# Patient Record
(demographics unavailable — no encounter records)

---

## 2018-04-10 NOTE — RAD
Indication:Epigastric abdominal pain and nausea. History of breast, 

uterine and skin cancer.

 

TECHNIQUE: CT abdomen chest, and pelvis without IV contrast with 

multiplanar reformats.

 

COMPARISON: None

 

FINDINGS: Limited exam due to lack of IV contrast.

 

CT CHEST: 3.7 x 3.7 cm low attenuating nodule is seen in the right thyroid

lobe. Heart is normal in size. No pericardial or pleural effusion. No 

axillary, mediastinal adenopathy. Evaluation of hilar lymph nodes limited 

due to lack of IV contrast. Lungs are clear.

 

CT abdomen and pelvis:

Noncontrast appearance of the liver, spleen, gallbladder, pancreas, 

adrenals and kidneys is within normal limits. No retroperitoneal or pelvic

adenopathy. No bowel obstruction. Normal appendix. Urinary bladder within 

normal limits. Status post hysterectomy. No solid adnexal lesions. No free

pelvic fluid. No suspicious bony lesion.

 

IMPRESSION: Limited exam due to lack of IV contrast.

1. Low attenuating right thyroid lobe lesion may represent a large colloid

cyst. Nonemergent ultrasound of the thyroid recommended.

2. No acute findings.

 

 

Electronically signed by: Mario Alberto Trent DO (4/10/2018 10:02 PM) [facility]

## 2018-04-10 NOTE — RAD
Indication: Shortness of breath, cough.

 

TECHNIQUE: PA and lateral views of the chest

 

COMPARISON: None

 

FINDINGS:

Heart is normal in size. Lungs are clear. No pneumothorax or pleural 

effusion. Visualized bony thorax is within normal limits.

 

IMPRESSION:

No acute cardiopulmonary process.

 

Electronically signed by: Mario Alberto Trent DO (4/10/2018 6:18 PM) [facility]
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
## 2018-04-11 NOTE — HP
ADMIT DATE:  2018



HISTORY OF PRESENT ILLNESS:  The patient is a 46-year-old  female

patient, who came to the Emergency Room with a complaint of chest pain that

described mostly on the left side radiating to the left shoulder and left arm. 

She rated the pain as 6/10 in severity, but denied any nausea or vomiting. 

Denied any diaphoresis.  Did complain of shortness of breath and feel dizzy. 

She was seen in the Emergency Room and was extensively investigated.  Her EKG

showed that she was in sinus rhythm with heart rate of _____ first-degree AV

block.  She has left atrial enlargement and left axis deviation and also an old

Q-waves in the anterior leads, which is likely from the old myocardial

infarction.  However, there are no ST segment changes consistent with acute

cardiac ischemia.  Her first set of cardiac enzyme was normal.  The patient was

admitted to do 2 more sets of cardiac enzyme and to consult the cardiology team.



PAST MEDICAL HISTORY:  Significant for rheumatoid arthritis, systemic lupus

erythematosus, hypertension.  She has cancer of the uterus, cervix, breast, skin

and brain.  She apparently metastatic brain tumor, treated with radiation

therapy.  She also has a history of pericarditis and according to her myocardial

infarction in .



PAST SURGICAL HISTORY:  Significant for hysterectomy, tonsillectomy,

adenoidectomy, 4 , lumpectomy, 2 dental surgeries and laryngotomy x 5.



ALLERGIES:  She is allergic to IODINATED CONTRAST, ORAL AND IV DYE, PENICILLIN,

SULFA, ACETAMINOPHEN, BANANA, CIPROFLOXACIN, CLARITHROMYCIN, STRAWBERRY.



FAMILY HISTORY:  She has 2 brothers, who are younger.  Her youngest brother has

skin cancer x 3 and he is also known to have _____.  Her father is still alive

and is known to have diabetes, hypertension, rheumatoid arthritis, and

hyperlipidemia.  Her mother is still alive, has diabetes, hypertension,

osteoarthritis and hyperlipidemia.



SOCIAL HISTORY:  She is , has 2 sons and 2 daughters.  She never smoked,

does not drink alcohol or use any drugs.  She is working for the [employer]

[employer] [facility] with [employer].



REVIEW OF SYSTEMS:  The patient denied any blurring of vision, cataract,

glaucoma or macular degeneration.  Denied any earache, tinnitus or sensorineural

deafness.  Denied any nosebleeds, stuffy nose or postnasal drip.  Denied any

sore throat, sore tongue, toothache, hoarseness of voice or difficulty

swallowing.  Denied any nausea, vomiting, diarrhea or constipation.  Denied any

hematemesis, melena or hematochezia.  Denied any dysuria, frequency or

hematuria.  Did may have chest pain.  No cough or phlegm.  Did complain of

shortness of breath.  Denied any orthopnea or paroxysmal dyspnea.  Denied any

cough, phlegm or hemoptysis.



OBJECTIVE:

GENERAL:  On examining her, she looked well and was clearly in no apparent

respiratory distress, no pallor, jaundice, cyanosis, or thyromegaly.  No jugular

venous distension.  No limb edema.

VITAL SIGNS:  Her heart rate was 86 and blood pressure 141/61, temperature was

98, respiratory rate was 16 and oxygen saturation was 100% on room air.

HEAD, EYES, EARS, NOSE AND THROAT:  Showed normocephalic, atraumatic.

NECK:  Supple.

HEART:  Showed normal first and second heart sounds with no gallop, rub or

murmur.

CHEST:  Clear to auscultation.  No crepitation or rhonchi.

ABDOMEN:  Distended, soft, nontender.

NEUROLOGIC:  She is awake, alert, responding appropriately.  All cranial nerves

intact.

EXTREMITIES:  She moves extremities without difficulty.  She ambulates without

assistance or assistive devices.



While in the Emergency Room, she had lab work done showed a white cell count of

6100, hemoglobin 14, hematocrit 41, MCV 91, and platelet count 107,000.  Her

chemistry showed a serum sodium 140, potassium 3.2, chloride 101, bicarbonate

29, anion gap of 10, BUN 9, creatinine 0.9, estimated GFR was 77, glucose 87,

calcium was 10.3, magnesium was 2.  Total bilirubin 2.  However, AST, ALT,

alkaline phosphatase were normal.  Her troponin was less than 0.017 and total

protein was 7.6, albumin was 4.3.  Her D-dimer was 0.26.  Urinalysis was

essentially unremarkable.



Her EKG showed that she has in sinus rhythm with prolonged TX interval.  Chest

x-ray showed no evidence of any acute cardiopulmonary process.  CT scan of the

chest, abdomen and pelvis showed that there is low attenuating right thyroid

lobe lesions were present a large colloid cyst, nonemergent ultrasound and

thyroid recommended no acute finding.  Plan is to admit the patient to do 2 more

sets of cardiac enzyme, consult the Cardiology and decide on further management

accordingly.





______________________________

DUTCH MATA MD



DR:  Dakota  JOB#:  [id number] / [id number]

DD:  2018 14:41  DT:  2018 15:14

## 2018-04-11 NOTE — EKG
[facility] [address], [place] [address]

Test Date:    2018-04-10               Test Time:    16:51:06

Pat Name:     ALEXANDRA MONGE            Department:   

Patient ID:   [id number]           Room:         120 A

Gender:       F                        Technician:   

:          1972               Requested By: JIM ENGEL

Order Number: [id number]            Reading MD:   Rgoer Perera MD

                                 Measurements

Intervals                              Axis          

Rate:         74                       P:            57

LA:           208                      QRS:          0

QRSD:         100                      T:            21

QT:           392                                    

QTc:          440                                    

                           Interpretive Statements

SINUS RHYTHM



Electronically Signed On 2018 16:41:05 CDT by Roger Perera MD

## 2018-04-12 NOTE — PDOC
PROVIDER NOTE


Late entry for 4/11/2018





Cardiology consultation note





Reason for consultation chest pain





Pleasant 46-year-old woman who comes into the hospital in the setting of chest 

pain. She had this chest pain for approximately 2 days prior to admission. 

Denies any previous cardiac issues. No associated nausea, vomiting, diaphoresis

, fevers or chills. No palpitations or syncopal events. Denies any exertional 

dyspnea, orthopnea or PND.





Past medical history is notable for uterine cancer





Social history is unremarkable. Patient denies any alcohol, tobacco or illicit 

drug use.





Family history is noncontributory





Allergies to multiple medications as noted.





Review systems is negative for 10 out of 14 systems reviewed unless otherwise 

mentioned above in history of present illness.





Physical examination


Vital signs stable


Head and neck exam unremarkable


Cardiac regular rate and rhythm without any murmurs rubs gallops lungs are 

clear to auscultation bilaterally.


Abdomen is obese nontender nondistended


Extremities no clubbing cyanosis or edema


Neurologic no focal deficits





Diagnostic studies


EKG is unremarkable


Labs are unremarkable.


Echocardiogram demonstrates normal LV systolic function without significant 

wall motor mellitus.





Impression:


1. Atypical chest pain without any alarm symptoms. Normal enzymes and EKG 

without any significant modalities. In light of the fact that her 

echocardiogram demonstrates normal regional wall motion can safely be 

discharged with reevaluation outpatient basis if she has any recurrent chest 

pain.





Thank you for this consultation.











ANIA YE MD Apr 12, 2018 16:11
190

## 2018-04-12 NOTE — CARD
MR#: [id number]

Account#: [id number]

Accession#: [id number]

Date of Study: 04/12/2018

Ordering Physician: ANIA YE, 

Referring Physician: DUTCH MATA, 

Tech: CARMELA Gotti





--------------- APPROVED REPORT --------------





EXAM: Two-dimensional and M-mode echocardiogram with Doppler and color Doppler.



Other Information 

Quality : AverageHR: 60bpm



INDICATION

Chest Pain 



2D DIMENSIONS 

RVDd3.2 (2.9-3.5cm)Left Atrium(2D)3.5 (1.6-4.0cm)

IVSd1.1 (0.7-1.1cm)Aortic Root(2D)2.7 (2.0-3.7cm)

LVDd4.7 (3.9-5.9cm)LVOT Diameter2.0 (1.8-2.4cm)

PWd1.4 (0.7-1.1cm)LVDs2.5 (2.5-4.0cm)

FS (%) 46.5 %SV81.2 ml

LVEF(%)77.9 (>50%)



Aortic Valve

AoV Peak Ismael.151.7cm/sAoV VTI36.9cm

AO Peak GR.9.2mmHgLVOT Peak Ismael.134.5cm/s

LVOT  VTI 31.86cmAO Mean GR.5mmHg

HUMZA (VMAX)2.86jz3FAP   (VTI)2.71cm2



Mitral Valve

MV E Usvjrnhg125.6cm/sMV A Velocity0.1cm/s

E/A  Vocnv2982.0



Pulmonary Vein

S1 Ltgrdrqq21.8cm/sD2 Oqrfbfmc47.9cm/s



 LEFT VENTRICLE 

The left ventricle is normal size. There is mild hypertrophy of the LVPW. The left ventricular systol
ic function is normal and the ejection fraction is within normal range. EF 65% There is normal LV seg
mental wall motion. The left ventricular diastolic function and filling is normal for age.



 RIGHT VENTRICLE 

The right ventricle is normal size. The right ventricular systolic function is normal.



 ATRIA 

The left atrium size is normal. The right atrium size is normal. The interatrial septum is intact wit
h no evidence for an atrial septal defect or patent foramen ovale as noted on 2-D or Doppler imaging.




 AORTIC VALVE 

The aortic valve is thickened but opens well. Doppler and Color Flow revealed no significant aortic r
egurgitation. There is no significant aortic valvular stenosis. There is no aortic valvular vegetatio
n.



 MITRAL VALVE 

The mitral valve is thickened but opens well. There is no evidence of mitral valve prolapse. There is
 no mitral valve stenosis. Doppler and Color-flow revealed trace to mild mitral regurgitation.



 TRICUSPID VALVE 

The tricuspid valve is not well visualized. Doppler and Color Flow revealed no tricuspid valve regurg
itation noted. There is no tricuspid valve prolapse or vegetation. There is no tricuspid valve stenos
is.



 PULMONIC VALVE 

The pulmonic valve is not well visualized. Doppler and Color Flow revealed no pulmonic valvular regur
gitation. There is no pulmonic valvular stenosis.



 GREAT VESSELS 

The aortic root is normal in size. The IVC collapses <50% with inspiration. 



 PERICARDIAL EFFUSION 

There is no pleural effusion. There is no evidence of significant pericardial effusion.



Critical Notification

Critical Value: No



<Conclusion>

The left ventricular systolic function is normal and the ejection fraction is within normal range. EF
 65%

There is normal LV segmental wall motion.



Signed by : Ania Ye, 

Electronically Approved : 04/12/2018 13:05:10

## 2018-04-14 NOTE — DS
DATE OF DISCHARGE:  04/12/2018



HOSPITAL COURSE:  The patient is a 46-year-old  female patient.  The

patient was admitted with a complaint of chest pain that she describes as a dull

aching, mostly left-sided, radiating to the left shoulder and left arm, rated

about 6/10 in severity; however, she denied any nausea, vomiting.  Denied any

diaphoresis.  Did complain of shortness of breath, feeling dizzy.  She was

evaluated in the Emergency Room.  Her EKG showed no evidence of any ischemic

changes, but she has first-degree heart block, left atrial enlargement and left

axis deviation.  She has had 3 sets of cardiac enzymes that were negative.  She

had an echocardiogram done, which showed that she has normal left ventricular

systolic function, normal ejection fraction.  Ejection fraction more than 65%,

normal left ventricular segmental wall motion.  We did consult the cardiology

team and they recommended that the patient can be discharged safely home given

that all her labs, EKGs and echocardiogram were within normal limits, and her

risk factor for premature coronary artery disease does not warrant any further

ischemic workup.



PHYSICAL EXAMINATION:

GENERAL:  When I examined her today, she looked well and was clearly in no

apparent respiratory distress.  She was pale.  No jaundice, cyanosis, or

thyromegaly.  No jugular venous distension.  No lower limb edema.

VITAL SIGNS:  Her heart rate was 60, blood pressure 147/74, temperature was

98.1, respiratory rate was 18 and oxygen saturation was 97% on room air.

HEAD, EYES, EARS, NOSE AND THROAT:  Showed she is normocephalic, atraumatic.

NECK:  Supple.

HEART:  Showed normal first and second heart sounds with no gallop, rub or

murmur.

CHEST:  Clear to auscultation.  No crepitation or rhonchi.

ABDOMEN:  Distended, soft, nontender.  No guarding or rigidity.  No

organomegaly.  All hernial orifices intact.  Bowel sounds normal.

NEUROLOGIC:  She was awake, alert, responding appropriately.  All cranial nerves

are intact.  She moves extremities without difficulty.  She ambulates without

assistance or assistive devices.



LABORATORY WORK:  On discharge date showed a serum sodium 141, potassium 3.8,

chloride 105, bicarbonate 29, anion gap of 7, BUN 7, creatinine 0.7, estimated

GFR was 90 mL per minute.  Her glucose was 93, calcium was 9.4.  Her TSH was

1.858 and white cell count was 5200, hemoglobin 13, hematocrit 38, MCV 92, and

platelet count of 193,000.



DISCHARGE MEDICATIONS:  The patient was discharged home to continue on following

medication:  Alprazolam 0.25 mg twice a day, buspirone 15 mg twice a day,

diphenhydramine 25 mg at bedtime, escitalopram oxalate 20 mg once a day,

losartan/hydrochlorothiazide one tablet daily, naproxen 220 mg twice a day,

Protonix 40 mg daily and trazodone 50 mg at bedtime.



FINAL DISCHARGE DIAGNOSIS:  Chest pain, myocardial infarction ruled out,

hypertension, depression.





______________________________

DUTCH MATA MD



DR:  BALA/aguila  JOB#:  [id number] / [id number]

DD:  04/13/2018 15:26  DT:  04/14/2018 03:54

## 2019-01-29 NOTE — [facility]
EXAM: MRI left shoulder

 

DATE: 1/29/2019 8:00 AM

 

COMPARISON: None

 

INDICATION: Left shoulder pain. Decreased range of motion.

 

TECHNIQUE: Multiplanar, multisequence MRI of the left shoulder was 

performed without contrast.

 

FINDINGS:

Mild AC joint degenerative changes are seen. Associated edema is likely 

degenerative. No evidence of fracture. Mild downsloping distal acromion. 

No os acromiale. Type I acromion.

 

Subacromial subdeltoid bursal distention likely bursitis. No glenohumeral 

joint effusion.

 

Mild thickening and increased signal within the supraspinatus tendon 

distally likely tendinosis. Shallow bursal sided tear of the distal 

supraspinatus tendon is seen involving approximately 10% tendon thickness.

 

Thickening of the intra-articular long head biceps tendon likely severe 

tendinosis. Extra articular long head biceps tendon is seen within the 

bicipital groove.

 

Evaluation of the labrum is limited on this nonarthrographic study. Mild 

diminutive appearance of the posterior labrum, possibly physiologic for 

this patient although may also be seen with tear.

 

No definite full-thickness cartilage tear is identified. No evidence for 

fracture or AVN.

 

IMPRESSION:   

1.  Shallow bursal sided tear within the supraspinatus tendon distally.

2.  Severe tendinosis intra-articular long head biceps tendon.

3.  Subacromial subdeltoid bursal distention likely bursitis.

4.  Mild diminutive appearance of the posterior labrum, possibly 

physiologic for this patient although may also be seen with tear

5.  AC joint degenerative changes with edema.

 

Electronically signed by: Fab Ruano MD (1/29/2019 10:09 AM) [facility]

## 2019-04-05 NOTE — PDOC4
Operative Note


Operative Note


Date of surgery: 4/5/2019





Preoperative diagnosis: Rotator cuff tear possible superior labral tear, 

acromioclavicular joint pain post contusion





Postoperative diagnosis: Rotator cuff had partial thickness bursal sided 

fraying superior labrum was intact she had some adhesive capsulitis and 

acromioclavicular joint degenerative change





Surgeon: Bruce





Assist: Juanpablo





Anesthesia: Gen. plus scalene block





Estimated blood loss 10 mL





Complications: None





Operative indications: Tawnya is a 47-year-old female that injured her right 

shoulder at work and had pain and weakness MRI was suspicious for a rotator 

cuff tear as well as superior labral abnormality. She was also clinically very 

tender over the acromioclavicular joint I had gone over with her the 

possibility of rotator cuff repair appropriate treatment of the superior labral 

injury and likely decompression distal clavicle's excision. I went through 

risks benefits postoperative course of the procedure with her the typical 

recovery and restrictions and rationale all her questions were answered she 

wishes to proceed with surgical evaluation and treatment.





Operative text: Patient was identified procedure verified patient placed in the 

supine position on the operating table. After adequate amounts of general 

anesthesia were administered the left upper extremity was prepped and draped in 

standard sterile fashion. Timeout was performed patient procedure identified 

and verified. The shoulder was first examined under anesthesia and found to 

have some limitation of terminal elevation as well as abduction and external 

rotation. This was restored with manipulation to full range of motion and no 

instability was noted. The arm was then placed in the arthroscopic arm lopez 

with a total of 15 pounds of traction a posterior portal was established 

anterior portal established using spinal needle localization and the shoulder 

joint was systematically examined. She did have some bleeding from the capsular 

release with manipulation the superior labrum appeared intact biceps tendon was 

just irritated but found to be free from any subluxation or significant 

fraying. Rotator cuff insertion was intact to the joint surface labrum was 

intact with only mild fraying not requiring any debridement glenohumeral joint 

surfaces otherwise noted be intact and she had a normal bare area of the 

humerus. Subacromial space was then entered bursectomy was performed to allow 

visualization and she was noted to have significant bursal sided fraying which 

was debrided back to stable tissue but did not require repair. Coracoacromial 

ligament was taken down and anterior acromial spur was converted to a type I 

acromion using cutting block technique. The distal clavicle was noted to be 

narrowed and arthritic and was excised 1 cm to allow a joint space preserving 

the overlying joint capsule for stability. Bony fragments were evacuated with 

arthroscopic shaver and the rotator cuff insertion examined in all degrees of 

internal/external rotation and no repair necessary. Joint was drained of 

arthroscopic fluid portals closed with nylon suture sterile dressings were 

applied she was returned to recovery room in stable condition having tolerated 

procedure well. Juanpablo dougherty was present for the procedure and assisted 

in the prepping draping skin closure











ARACELI WISEMAN MD Apr 5, 2019 09:58

## 2019-04-05 NOTE — DISCH
DISCHARGE INSTRUCTIONS


Condition on Discharge


Condition on Discharge:  Stable





Activity After Discharge


Activity Instructions for Disc:  Other ROM activity ( sling for comfort may 

remove for active passive range of motion strengthening physical therapy and 

other activities), Other, see below (passive and active stretching advancing to 

strengthening as tolerated)


Exercise Instruction after Dis:  Exercise per therapy


Weight Bearing Status after Di:  Non weight bearing (fine motor use of hand at 

side only with home and work activities)





Diet after Discharge


Diet after Discharge:  Regular





Wound Incision Care


Wound/Incision Care:  Change dressing (remove dressing in 2 days may then shower

, no soaking until sutures removed)





Community/Resources/Services


Services at Discharge:  PT EVALUATE & TREAT (aggressive passive and active 

range of motion advancing the strengthening as tolerated no other restrictions)





Contacting the  after DC


Call your doctor for:  Concerns you may have





Follow-Up


Follow up with:  Dr. Barrera 10 days











ARACELI BARRERA MD Apr 5, 2019 06:58

## 2019-06-04 NOTE — DISCH
DISCHARGE INSTRUCTIONS


Condition on Discharge


Condition on Discharge:  Stable





Activity After Discharge


Activity Instructions for Disc:  No restrictions (no sling or limitations wh

atsoever), Other, see below (mediate aggressive range of motion with physical 

therapy)





Diet after Discharge


Diet after Discharge:  Regular





Wound Incision Care


Wound/Incision Care:  Ice to area for comfort





Community/Resources/Services


Services at Discharge:  PT EVALUATE & TREAT (start physical therapy as soon as 

possible no later than tomorrow)











ARACELI WISEMAN MD            Jun 4, 2019 11:01

## 2019-06-04 NOTE — PDOC4
Operative Note


Operative Note


Date of surgery: 6/4/2019





Preoperative diagnosis: Adhesive capsulitis left shoulder





Postoperative diagnosis: Same





Operative procedure: Manipulation left shoulder under anesthesia and injection 

glenohumeral joint





Surgeon: Bruce





Anesthesia with deep sedation





Complications: None





Operative indications: Patient underwent previous shoulder surgery and has been 

regressing in her range of motion despite attempts of physical therapy and home 

stretching program strength is getting better but motion is decreasing as is her

pain. We had discussed adhesive capsulitis and the natural course of that entity

as well as the possibility of it running its course, the ineffectiveness of 

current stretching and physical therapy regimen and the possibility of 

manipulation under anesthesia. She wishes to proceed with manipulation and inje

ction and understands that she needs to get back into physical therapy right 

away to keep the motion that has already been regained.





Operative text: Patient was identified procedure verified patient placed in the 

supine position on the operating table. After adequate amounts of deep sedation 

provided by anesthesia the shoulder was examined under anesthesia and found to 

lack terminal elevation and external rotation and abduction of about 65 each, 

internal rotation was lacking about 20. After manipulation palpable and audible

release of tissue was noted and full range of motion was regained without 

instability and under sterile conditions 3 mL of half percent plain Marcaine and

1 mL 80 mg/mm Depo-Medrol were injected into the glenohumeral joint from an ante

rior approach. She was recovered in stable condition and instructed in immediate

physical therapy with aggressive range of motion and no restrictions whatsoever











ARACELI WISEMAN MD            Jun 4, 2019 11:06

## 2020-08-10 NOTE — PHYS DOC
Past History


Past Medical History:  Anxiety, Asthma, Depression, Hypertension, Other


Additional Past Medical Histor:  C3C4 COMPRESSION FX, URINARY REFLUX


Past Surgical History:  , Hysterectomy, Tonsillectomy, Other


Additional Past Surgical Histo:  L ROTATOR CUFF, R-BREAST LUMPECTOMY


Smoking:  Non-smoker


Alcohol Use:  None


Drug Use:  None





General Adult


EDM:


Chief Complaint:  HEADACHE





HPI:


HPI:





Patient is a [age] year old [sex] who presents with []





Review of Systems:


Review of Systems:


Constitutional:  Denies fever or chills 


Eyes:  Denies change in visual acuity 


HENT:  Denies nasal congestion or sore throat 


Respiratory:  Denies cough or shortness of breath 


Cardiovascular:  Denies chest pain or edema 


GI:  Denies abdominal pain, nausea, vomiting, bloody stools or diarrhea 


: Denies dysuria 


Musculoskeletal:  Denies back pain or joint pain 


Integument:  Denies rash 


Neurologic:  Denies headache, focal weakness or sensory changes 


Endocrine:  Denies polyuria or polydipsia 


Lymphatic:  Denies swollen glands 


Psychiatric:  Denies depression or anxiety





Heart Score:


Risk Factors:


Risk Factors:  DM, Current or recent (<one month) smoker, HTN, HLP, family 

history of CAD, obesity.


Risk Scores:


Score 0 - 3:  2.5% MACE over next 6 weeks - Discharge Home


Score 4 - 6:  20.3% MACE over next 6 weeks - Admit for Clinical Observation


Score 7 - 10:  72.7% MACE over next 6 weeks - Early Invasive Strategies





Current Medications:


Current Meds:





Current Medications








 Medications


  (Trade)  Dose


 Ordered  Sig/Rand  Start Time


 Stop Time Status Last Admin


Dose Admin


 


 Dexamethasone


  (Decadron)  10 mg  1X  ONCE  8/10/20 11:15


 8/10/20 11:16   














Allergies:


Allergies:





Allergies








Coded Allergies Type Severity Reaction Last Updated Verified


 


  banana Allergy Severe Anaphylaxis 18 Yes


 


  fish derived Allergy Severe  18 Yes


 


  bill Allergy Severe  18 Yes


 


  nut - unspecified Allergy Severe Anaphylaxis 18 Yes


 


  strawberry Allergy Severe Anaphylaxis 18 Yes


 


  Iodinated Contrast- Oral and IV Dye Allergy Intermediate  4/10/18 Yes


 


  Penicillins Allergy Intermediate  4/10/18 Yes


 


  Sulfa (Sulfonamide Antibiotics) Allergy Intermediate  4/10/18 Yes


 


  ciprofloxacin Allergy Intermediate  4/10/18 Yes


 


  clarithromycin Allergy Intermediate  4/10/18 Yes


 


  hydrocodone Allergy Intermediate  4/10/18 Yes


 


  ibuprofen Allergy Intermediate  4/10/18 Yes


 


  latex Allergy Intermediate  4/10/18 Yes


 


  levofloxacin Allergy Intermediate  4/10/18 Yes


 


  shellfish derived Allergy Intermediate  4/10/18 Yes











Physical Exam:


PE:





Constitutional: Well developed, well nourished, no acute distress, non-toxic 

appearance. []


HENT: Normocephalic, atraumatic, bilateral external ears normal, oropharynx 

moist, no oral exudates, nose normal. []


Eyes: PERRLA, EOMI, conjunctiva normal, no discharge. [] 


Neck: Normal range of motion, no tenderness, supple, no stridor. [] 


Cardiovascular:Heart rate regular rhythm, no murmur []


Lungs & Thorax:  Bilateral breath sounds clear to auscultation []


Abdomen: Bowel sounds normal, soft, no tenderness, no masses, no pulsatile 

masses. [] 


Skin: Warm, dry, no erythema, no rash. [] 


Back: No tenderness, no CVA tenderness. [] 


Extremities: No tenderness, no cyanosis, no clubbing, ROM intact, no edema. [] 


Neurologic: Alert and oriented X 3, normal motor function, normal sensory 

function, no focal deficits noted. []


Psychologic: Affect normal, judgement normal, mood normal. []





Current Patient Data:


Vital Signs:





                                   Vital Signs








  Date Time  Temp Pulse Resp B/P (MAP) Pulse Ox O2 Delivery O2 Flow Rate FiO2


 


8/10/20 10:05 98.8 70 20 148/83 (104) 98 Room Air  











EKG:


EKG:


[]





Radiology/Procedures:


Radiology/Procedures:


[]





Course & Med Decision Making:


Course & Med Decision Making


Pertinent Labs and Imaging studies reviewed. (See chart for details)





[]





Dragon Disclaimer:


Dragon Disclaimer:


This electronic medical record was generated, in whole or in part, using a voice

 recognition dictation system.





Departure


Departure:


Impression:  


   Primary Impression:  


   Headache


   Qualified Codes:  R51 - Headache


Disposition:  01 HOME/RESIDENCE PRIOR TO ADM


Condition:  STABLE


Referrals:  


KASEY YOUNG MD (PCP)


Patient Instructions:  Headache, FAQs, Spinal Headache


Scripts


Prednisone (PREDNISONE) 20 Mg Tablet


2 TAB PO DAILY for Inflammation, #8 TAB


   Start this prescription tomorrow, 20


   Prov: JESSICA BRADY DO         8/10/20 


Oxycodone Hcl/Acetaminophen (PERCOCET 5-325 MG TABLET  **) 1 Each Tablet


0.5-1 TAB PO PRN Q6HRS PRN for PAIN, #10 TAB


   Prov: JESSICA BRADY DO         8/10/20





Justification of Admission:


Justification of Admission:


Justification of Admission Dx:  N/A











JESSICA BRADY DO             Aug 10, 2020 11:10

## 2021-02-13 NOTE — RAD
XR CHEST 1V 



INDICATION: Reason: HTN, ASTHMA, H/O BREAST CANCER / Spl. Instructions:  / History: . 



COMPARISON STUDY: Radiograph 4/10/2018.



FINDINGS:



Lungs: Normal lung volume. No pulmonary mass or consolidation. The tracheobronchial tree and hilar st
ructures are normal.



Pleura: No pleural effusion or pneumothorax.



Heart and Mediastinum: The cardiomediastinal silhouette is normal. The great vessels of the thorax ar
e normal.



IMPRESSION:  

No acute cardiopulmonary process.



Electronically signed by: Matthew Carter MD (2/13/2021 8:22 PM) [facility]

## 2021-02-13 NOTE — RAD
CT scan of the head without contrast 2/13/2021





Clinical History: Dizziness.



Technique: Unenhanced, contiguous, 5 mm axial sections were obtained through the head.



One or more of the following individualized dose reduction techniques were utilized for this study:



1. Automated exposure control.

2. Adjustment of the mA and/or kV according to patient size.

3. Use of iterative reconstruction technique.





Findings: The ventricles and sulci are within normal limits in size and configuration. No acute paren
chymal abnormality is seen. No extra-axial fluid collection is noted. No skull fracture is seen. 



Impression:  No acute intracranial abnormality is seen.









CT scan of the cervical spine without contrast 2/13/2021



Clinical history: Neck pain.



Technique: Unenhanced, contiguous, 0.625 mm axial sections were obtained through the cervical spine. 
Axial, coronal and sagittal reconstructed images were obtained.



One or more of the following individualized dose reduction techniques were utilized for this study:



1. Automated exposure control.

2. Adjustment of the mA and/or kV according to patient size.

3. Use of iterative reconstruction technique.





Findings: Sagittal and coronal reconstructed images demonstrate straightening of the normal cervical 
lordosis. The patient is post anterior fusion using an anterior plate, bone screws and bone graft mat
erial at C3-4. Degenerative changes consisting of varying degrees of disc space narrowing, vertebral 
endplate sclerosis and minimal to mild anterior and posterior vertebral body osteophyte formation are
 seen involving the C4-5, C5-6, C6-7 and C7-T1 disc spaces.



No fracture or subluxation of the cervical vertebrae is seen. Degenerative changes are seen involving
 the uncovertebral and facet joints throughout the mid and lower cervical disc spaces. No area of sig
nificant central spinal canal or neural foraminal stenosis is seen.



IMPRESSION:

1. Post anterior fusion at C3-4.

2. Degenerative changes are seen throughout the cervical spine. These findings do not result in signi
ficant central spinal canal or neural foraminal stenosis. No acute osseous abnormality is seen.





Electronically signed by: Tyrone Posey MD (2/13/2021 11:44 PM) [id number]

## 2021-02-13 NOTE — EKG
[facility] [address], [place] [address]

Test Date:    2021               Test Time:    20:33:36

Pat Name:     ALEXANDRA MONGE            Department:   

Patient ID:   [id number]           Room:          

Gender:       F                        Technician:   

:          1972               Requested By: MARTA WELSH

Order Number: [id number]            Reading MD:     

                                 Measurements

Intervals                              Axis          

Rate:         58                       P:            48

OH:           210                      QRS:          15

QRSD:         106                      T:            3

QT:           494                                    

QTc:          489                                    

                           Interpretive Statements

SINUS RHYTHM

PROLONGED QT

NO SPECIFIC ECG ABNORMALITIES

RI6.02

No previous ECG available for comparison

## 2021-02-13 NOTE — EKG
[facility] [address], [place] [address]

Test Date:    2021               Test Time:    21:37:14

Pat Name:     ALEXANDRA MONGE            Department:   

Patient ID:   [id number]           Room:          

Gender:       F                        Technician:   

:          1972               Requested By: MARTA WELSH

Order Number: [id number]            Reading MD:     

                                 Measurements

Intervals                              Axis          

Rate:         53                       P:            52

SD:           218                      QRS:          13

QRSD:         102                      T:            1

QT:           490                                    

QTc:          462                                    

                           Interpretive Statements

SINUS RHYTHM

NORMAL ECG

RI6.02

No previous ECG available for comparison

## 2021-02-13 NOTE — PHYS DOC
Past History


Past Medical History:  Anxiety, Asthma, Depression, Hypertension, Other


Additional Past Medical Histor:  C3C4 COMPRESSION FX, URINARY REFLUX


 (MARTA WELSH)


Past Surgical History:  , Hysterectomy, Tonsillectomy, Other


Additional Past Surgical Histo:  L ROTATOR CUFF, R-BREAST LUMPECTOMY


 (MARTA WELSH)


Smoking:  Non-smoker


Alcohol Use:  None


Drug Use:  None


 (MARTA WELSH)





Adult General


Chief Complaint


Chief Complaint:  DIZZY/LIGHT HEADED





HPI


HPI





Patient is a 49-year-old female patient with history of anxiety, depression, 

hypertension, cervical cancer, among other illnesses who presents to the ED 

today complaining of dizziness with ringing in the ears.  Patient states she was

walking to the bathroom when she felt dizzy and had ringing in her ears was 

light headed and also has pain btwn her shoulder blades.  She states she had 

cervical fusion on 2021 and called the surgeon who did the procedure

who requested her to come to the ED for to be worked up for blood clot.  Denies 

any chest pain or shortness of.


 (MARTA WELSH)





Review of Systems


Review of Systems





Constitutional: Denies fever or chills []


Eyes: Denies change in visual acuity, redness, or eye pain []


HENT: Denies nasal congestion or sore throat []


Respiratory: Denies cough or shortness of breath []


Cardiovascular: No additional information not addressed in HPI []


GI: Denies abdominal pain, nausea, vomiting, bloody stools or diarrhea []


: Denies dysuria or hematuria []


Musculoskeletal: Denies back pain or joint pain []


Integument: Denies rash or skin lesions []


Neurologic: Reports dizziness and ringing in the ears.  Denies headache, focal 

weakness or sensory changes []








All other systems were reviewed and found to be within normal limits, except as 

documented in this note.


 (MARTA WELSH)





Allergies


Allergies





Allergies








Coded Allergies Type Severity Reaction Last Updated Verified


 


  banana Allergy Severe Anaphylaxis 21 Yes


 


  fish derived Allergy Severe  21 Yes


 


  bill Allergy Severe  21 Yes


 


  nut - unspecified Allergy Severe Anaphylaxis 21 Yes


 


  strawberry Allergy Severe Anaphylaxis 21 Yes


 


  Iodinated Contrast Media Allergy Intermediate  21 Yes


 


  Penicillins Allergy Intermediate  4/10/18 Yes


 


  Sulfa (Sulfonamide Antibiotics) Allergy Intermediate  4/10/18 Yes


 


  ciprofloxacin Allergy Intermediate  4/10/18 Yes


 


  clarithromycin Allergy Intermediate  21 Yes


 


  hydrocodone Allergy Intermediate  21 Yes


 


  ibuprofen Allergy Intermediate  21 Yes


 


  latex Allergy Intermediate  21 Yes


 


  levofloxacin Allergy Intermediate  21 Yes


 


  shellfish derived Allergy Intermediate  21 Yes








 (MARTA WELSH)





Physical Exam


Physical Exam





Constitutional: Well developed, well nourished, no acute distress, non-toxic 

appearance. []


HENT: Normocephalic, atraumatic, bilateral external ears normal, oropharynx 

moist, no oral exudates, nose normal. []


Eyes: PERRLA, EOMI, conjunctiva normal, no discharge. [] 


Neck: Normal range of motion, no tenderness, supple, no stridor. [] 


Cardiovascular:Heart rate regular rhythm, no murmur []


Lungs & Thorax:  Bilateral breath sounds clear to auscultation []


Abdomen: Bowel sounds normal, soft, no tenderness, no masses, no pulsatile 

masses. [] 


Skin: Warm, dry, no erythema, no rash. [] 


Back: No tenderness, no CVA tenderness. [] 


Extremities: No tenderness, no cyanosis, no clubbing, ROM intact, no edema. [] 


Neurologic: Alert and oriented X 3, normal motor function, normal sensory 

function, no focal deficits noted.  Cranial nerves II through XII intact


Psychologic: Affect normal, judgement normal, mood normal. []


 (MARTA WELSH)





EKG


EKG


 interpreted by Dr. Houston sinus rhythm HR58 no STEMI[]


 (MARTA WELSH)





Radiology/Procedures


Radiology/Procedures


[]


 (MARTA WELSH)


Radiology/Procedures


[facility] [place] [address] [phone]


                                        


                                 IMAGING REPORT





                                     Signed





PATIENT: ALEXANDRA MONGE  ACCOUNT: [id number]     MRN#: [id number]


: 1972           LOCATION: ER              AGE: 49


SEX: F                    EXAM DT: 21         ACCESSION#: [id number]


STATUS: PRE ER            ORD. PHYSICIAN: MARTA WELSH


REASON: HTN, ASTHMA, H/O BREAST CANCER


PROCEDURE: PORTABLE CHEST 1V





XR CHEST 1V 





INDICATION: Reason: HTN, ASTHMA, H/O BREAST CANCER / Spl. Instructions:  / 

History: . 





COMPARISON STUDY: Radiograph 4/10/2018.





FINDINGS:





Lungs: Normal lung volume. No pulmonary mass or consolidation. The 

tracheobronchial tree and hilar structures are normal.





Pleura: No pleural effusion or pneumothorax.





Heart and Mediastinum: The cardiomediastinal silhouette is normal. The great 

vessels of the thorax are normal.





IMPRESSION:  


No acute cardiopulmonary process.





Electronically signed by: Veronica Carter MD (2021 8:22 PM) [facility]














DICTATED AND SIGNED BY:     VERONICA CARTER MD


DATE:     21





CC: KASEY YOUNG MD; MARTA WELSH ~MTH0 0





 (ARACELI ASHFORD MD)


Heart Score


Risk Factors:


Risk Factors:  DM, Current or recent (<one month) smoker, HTN, HLP, family h

istory of CAD, obesity.


Risk Scores:


Risk Factors:  DM, Current or recent (<one month) smoker, HTN, HLP, family 

history of CAD, obesity.


 (MARTA WELSH)





Course & Med Decision Making


Course & Med Decision Making


Pertinent Labs and Imaging studies reviewed. (See chart for details)





This is a 49-year-old female patient presented to the ED today complaining of 

dizziness, lightheadness,  ringing in the ears and pain between her shoulder 

blades that began today.  Patient states she had anterior cervical fusion on 

2021, she called the surgeon who told her to come to the ED to be 

evaluated for blood clot.





2204 Care tx to Dr. Houston





 (MARTA WELSH)


Course & Med Decision Making


See  Marta Welsh chart for details


                               [facility] [address], [place] [address] [phone]


                                        


                                 IMAGING REPORT





                                     Signed





PATIENT: ALEXANDRA MONGE  ACCOUNT: [id number]     MRN#: [id number]


: 1972           LOCATION: [address] [place] [address]         AGE: 49


SEX: F                    EXAM DT: 21         ACCESSION#: [id number]


STATUS: ADM IN            ORD. PHYSICIAN: ARACELI ASHFORD MD


REASON: DIZZY, NECK PAIN


PROCEDURE: CT HEAD AND CERVICAL SPINE WO





CT scan of the head without contrast 2021








Clinical History: Dizziness.





Technique: Unenhanced, contiguous, 5 mm axial sections were obtained through the

 head.





One or more of the following individualized dose reduction techniques were 

utilized for this study:





1. Automated exposure control.


2. Adjustment of the mA and/or kV according to patient size.


3. Use of iterative reconstruction technique.








Findings: The ventricles and sulci are within normal limits in size and 

configuration. No acute parenchymal abnormality is seen. No extra-axial fluid 

collection is noted. No skull fracture is seen. 





Impression:  No acute intracranial abnormality is seen.














CT scan of the cervical spine without contrast 2021





Clinical history: Neck pain.





Technique: Unenhanced, contiguous, 0.625 mm axial sections were obtained through

 the cervical spine. Axial, coronal and sagittal reconstructed images were 

obtained.





One or more of the following individualized dose reduction techniques were 

utilized for this study:





1. Automated exposure control.


2. Adjustment of the mA and/or kV according to patient size.


3. Use of iterative reconstruction technique.








Findings: Sagittal and coronal reconstructed images demonstrate straightening of

 the normal cervical lordosis. The patient is post anterior fusion using an 

anterior plate, bone screws and bone graft material at C3-4. Degenerative 

changes consisting of varying degrees of disc space narrowing, vertebral 

endplate sclerosis and minimal to mild anterior and posterior vertebral body 

osteophyte formation are seen involving the C4-5, C5-6, C6-7 and C7-T1 disc 

spaces.





No fracture or subluxation of the cervical vertebrae is seen. Degenerative 

changes are seen involving the uncovertebral and facet joints throughout the mid

 and lower cervical disc spaces. No area of significant central spinal canal or 

neural foraminal stenosis is seen.





IMPRESSION:


1. Post anterior fusion at C3-4.


2. Degenerative changes are seen throughout the cervical spine. These findings 

do not result in significant central spinal canal or neural foraminal stenosis. 

No acute osseous abnormality is seen.








Electronically signed by: Tyrone Posey MD (2021 11:44 PM) [id number]














DICTATED AND SIGNED BY:     TYRONE POSEY MD


DATE:     21 4220





CC: KASEY YOUNG MD; ARACELI ASHFORD MD ~MTH0 0





Discussed presentation, testing and tx. plan with Dr. Young-  Admit to his 

service








Impression:





1. Dizzy


2. Bradycardia


3. Hypokalemia 2.8


4. Hypomagnesiumk 1.7


5. Dehydration


6. Hx. of Multiple Allergies-  Med and Foods


 (ARACELI ASHFORD MD)


Rubi Disclaimer


Dragon Disclaimer


This electronic medical record was generated, in whole or in part, using a voice

 recognition dictation system.


 (MARTA WELSH)





Departure


Departure:


Impression:  


   Primary Impression:  


   Dizziness


Referrals:  


KASEY YOUNG MD (PCP)





Dragon Disclaimer


This chart was dictated in whole or in part using Voice Recognition software in 

a busy, high-work load, and often noisy Emergency Department environment.  It 

may contain unintended and wholly unrecognized errors or omissions.


 (ARACELI ASHFORD MD)











MARTA WELSH              2021 19:55


ARACELI ASHFORD MD           2021 22:36

## 2021-02-14 NOTE — RAD
CT THORACIC SPINE WO 



2/14/2021 12:51 PM 



Indication:  severe pain  



Comparison: None.



Technique:  CT imaging of the thoracic spine was performed without contrast. Coronal and sagittal ref
ormatted images were performed. One or more of the following dose reduction techniques were utilized:
 Automated exposure control (AEC), Adjustment of mA and/or kV according to patient size, Use of itera
tive reconstruction technique such as ASiR, CT scan done according to ALARA and image gently/image wi
sely.



Findings:

The thoracic spine is normally aligned. No acute fracture. Vertebral body heights are maintained with
out compression deformity. Multilevel degenerative disc height loss. No aggressive lytic or blastic o
sseous lesion.



No high-grade spinal canal stenosis or neural foraminal narrowing.



The visualized lungs are clear. No pleural effusion. The visualized thoracic aorta is normal caliber.




Impression:



No acute osseous abnormality of the thoracic spine. 



Electronically signed by: Matthew Carter MD (2/14/2021 1:42 PM) [id number]

## 2021-02-14 NOTE — NUR
The patient, ALEXANDRA MONGE, 50 y/o, F admitted by KASEY YOUNG MD, to room 119, was 
given written information regarding hospital policies, unit procedures and contact persons.  




Valuables were checked and left with the patient.  Medical history and medications reviewed. 
 Assessments completed.  Pt placed on telemetry.  Will continue to monitor.

## 2021-02-14 NOTE — HP
ADMIT DATE:  2021



HISTORY OF PRESENT ILLNESS:  A 49-year-old female, apparently had surgery on her

cervical spine here approximately a month ago.  She had a fusion.  The patient

had been doing well.  However, the patient noted that she had suffered a severe

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

compensation, but in any case, she was doing well after surgery until day of

admission when she began to have significant dizziness, lightheadedness, and

inability to walk, severe excruciating pain in her upper back.  She denied

anterior chest pain at all, but the patient was admitted to the hospital for

further evaluation and treatment of the severity of this pain.  She was seen

initially through the Emergency Room and she was in critical amount of pain and

inability to mobilize.



PAST MEDICAL HISTORY:  She has had a thyroid nodule, tonsillectomy, and

adenoidectomy.  The patient has had previous history of heart attack,

hypercholesterolemia, asthma, sleep apnea, obesity, history of possible breast

cancer, cervical cancer, uterine cancer, lumpectomy, hysterectomy, ,

multiple urinary tract infections, right knee surgery, PTSD, depression,

anxiety, skin cancer, possible symptoms of lupus, and influenza.  The patient

had a history of a C3-C4 fusion as well as a left rotator cuff repair.



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

the significant debilitating disabling injuries.



ALLERGIES:  THE PATIENT HAS ALLERGY TO IODINE MEDIA, PENICILLIN, SULFUR, BANANA,

CIPRO, CLARITHROMYCIN, BIAXIN, FISH DERIVED, HYDROCODONE, IBUPROFEN, LATEX,

LEVOTHYROXINE, YOCASTA, NUTS, SHELLFISH DERIVED, AND STRAWBERRIES.



FAMILY HISTORY:  Positive for father and mother with diabetes, brother and son

with skin cancer and hypertension, father and mother hypercholesterolemia,

mother heart attack, father and son heart disease, and heart attack in the

father.



REVIEW OF SYSTEMS:  The patient notes she has increased shortness of breath,

weakness, dizziness, and lightheadedness when attempting to walk.  The patient,

otherwise, denies any nausea, vomiting, or diaphoresis.  Denies any melena,

hematochezia, hematemesis or problems urination or any focal weakness anywhere

else.  She communicates well.  No trouble with understanding or communicating

her speech.



PHYSICAL EXAMINATION:

GENERAL:  This is a pleasant white female, moderate amount of pain.

VITAL SIGNS:  Blood pressure 120/70, respiration 18, pulse upwards of over 100

at times.  Blood pressure did drop down to 80/46, oxygen saturation 96%.

HEENT:  The patient's head was atraumatic and normocephalic.  Eyes:  PERRL.

NECK:  Does have limited range of motion with stiffness to the neck.  Surgery on

the left side of the neck itself for the fusion.  The patient's range of motion

of the left shoulder is slightly decreased with previous history of rotator cuff

injury.  Otherwise, the lungs were clear to auscultation.  There was no pain on

palpation of the thoracic spine, but some tenderness to the cervical spine.  The

patient had normal sensation down her arms to her hands.  Normal .

LUNGS:  Clear to auscultation.

CARDIOVASCULAR:  Regular sinus rhythm, S1, S2.  At times, tachycardic.

ABDOMEN:  Soft, protuberant, nontender.  No rebound or guarding.  Positive bowel

sounds.  No hepatosplenomegaly.

EXTREMITIES:  No clubbing, cyanosis, nor edema.

NEUROLOGIC:  The patient was alert and oriented.  Speech is fluent, spontaneous,

appropriate.  Appropriate speech was noted.  The patient has good eye contact. 

Eyes were PERRLA, EOMI.  Reflexes symmetrical, but marked tenderness in that

upper back area.  Reflexes are brisk, but symmetrical.  Plantars are down.  Gait

not tested because of her generalized weakness.



LABORATORY DATA:  The patient's CBC was all within normal limits.  Chemistries

did show a significant drop in her potassium down to 2.3, albumin of 3.3,

magnesium was low at 1.7, glucose stable, creatinine of 1.5 with a BUN of 13. 

Cardiac enzymes so far have been negative.  EKG showed normal sinus rhythm.  The

patient's chest x-ray was unremarkable, no acute findings noted.  A CT head

showed no acute intracranial problems.  Neck, anterior fusion, C3-C4

degenerative changes seen through the cervical spine, otherwise unremarkable

there.  However, we will get a CT of the thoracic spine and an echocardiogram,

as she has had multiple obvious complicating injuries and other medical

problems.



IMPRESSION AND PLAN:  Intractable thoracic pain, chest pain, history of cervical

fusion within the last month or thereabouts, previous history of cervical,

uterine, and breast cancers,  history of coronary artery disease, thyroid

disease, history of possible lupus.  The patient will be admitted for close

monitoring for any signs of infection, blood clots or the like.  We will do CT

scan of the thoracic spine and V/Q scan because of her ALLERGY TO IODINE, get an

echocardiogram because of her generalized weakness and lightheadedness when she

is walking.  We will look for other signs of possible latent infection or latent

other abnormalities of the surgery itself.  Otherwise, she seems to be resting

comfortably, has asked for just some mild Tylenol No. 3 at the present time,

although she is just lying in bed, placed on Lovenox and performed these other

procedures and test as indicated.





______________________________

KASEY YOUNG MD



DR:  BETTY/aguila  JOB#:  [id number] / [id number]

DD:  2021 13:16  DT:  2021 13:43

## 2021-02-15 NOTE — CARD
MR#: [id number]

Account#: [id number]

Accession#: [id number]

Date of Study: 02/15/2021

Ordering Physician: KASEY YOUNG, 

Referring Physician: KASEY YOUNG, 

Tech: Tori Rosales, [facility]





--------------- APPROVED REPORT --------------





EXAM: Two-dimensional and M-mode echocardiogram with Doppler and color Doppler.



Other Information 

Quality : AverageHR: 87bpm



INDICATION

Dizziness and Vertigo 

Dyspnea 



RISK FACTORS

Hypertension 

Asthma



2D DIMENSIONS 

RVDd3.0 (2.9-3.5cm)Left Atrium(2D)3.2 (1.6-4.0cm)

IVSd0.9 (0.7-1.1cm)Aortic Root(2D)2.8 (2.0-3.7cm)

LVDd5.2 (3.9-5.9cm)LVOT Diameter2.0 (1.8-2.4cm)

PWd0.9 (0.7-1.1cm)LVDs3.1 (2.5-4.0cm)

FS (%) 40.7 %SV92.2 ml



Aortic Valve

AoV Peak Ismael.150.0cm/sAoV VTI33.5cm

AO Peak GR.9.0mmHgLVOT Peak Ismael.118.9cm/s

LVOT  VTI 27.09cmAO Mean GR.5mmHg

HUMZA (VMAX)2.85la4SYS   (VTI)2.45cm2



Mitral Valve

MV E Qoeejkci266.5cm/sMV DECEL IHEC311cy

MV A Ljyzycmm10.4cm/sE/A  Ratio1.8



Pulmonary Valve

PV Peak Fpoydrax04.0cm/sPV Peak Grad.2mmHg



Tricuspid Valve

TR P. Zbcrhfwe384bq/sRAP NONDWWWG01ieLg

TR Peak Gr.42ptFpZQMH12xiSp



Pulmonary Vein

S1 Bddsljtk35.6cm/sD2 Wvtwfnpq61.5cm/s



 LEFT VENTRICLE 

The left ventricle is normal size. There is normal left ventricular wall thickness. The left ventricu
lar systolic function is normal and the ejection fraction is within normal range. The Ejection Fracti
on is 55-60%. There is normal LV segmental wall motion. Transmitral Doppler flow pattern is Grade II-
pseudonormal filling dynamics.



 RIGHT VENTRICLE 

The right ventricle is normal size. There is normal right ventricular wall thickness. The right ventr
icular systolic function is normal.



 ATRIA 

The left atrium size is normal. The right atrium size is normal. The interatrial septum is intact wit
h no evidence for an atrial septal defect or patent foramen ovale as noted on 2-D or Doppler imaging.




 AORTIC VALVE 

The aortic valve is normal in structure and function. Doppler and Color Flow revealed no significant 
aortic regurgitation. There is no significant aortic valvular stenosis. Calculated aortic valve area 
is 2.5 cm2 with maximum pressure gradient of 9 mmHg and mean pressure gradient of 5 mmHg.



 MITRAL VALVE 

The mitral valve is normal in structure and function. There is no evidence of mitral valve prolapse. 
There is no mitral valve stenosis. Doppler and Color Flow revealed no mitral valve regurgitation note
d.



 TRICUSPID VALVE 

The tricuspid valve is normal in structure and function. Doppler and Color Flow revealed trace tricus
pid regurgitation with an estimated PAP of 46 mmHg. There is no tricuspid valve stenosis.



 PULMONIC VALVE 

The pulmonic valve is not well visualized. Doppler and Color Flow revealed trace pulmonic valvular re
gurgitation.



 GREAT VESSELS 

The aortic root is normal in size. The IVC is dilated and collapses <50% with inspiration.



 PERICARDIAL EFFUSION 

There is no evidence of significant pericardial effusion.



Critical Notification

Critical Value: No



<Conclusion>

The left ventricle is normal size.

The left ventricular systolic function is normal and the ejection fraction is within normal range.

The Ejection Fraction is 55-60%.

Doppler and Color Flow revealed no significant aortic regurgitation.

There is no significant aortic valvular stenosis.

Calculated aortic valve area is 2.5 cm2 with maximum pressure gradient of 9 mmHg and mean pressure gr
adient of 5 mmHg.

Doppler and Color Flow revealed no mitral valve regurgitation noted.

Doppler and Color Flow revealed trace tricuspid regurgitation with an estimated PAP of 46 mmHg.



Signed by : Clem Gregg MD

Electronically Approved : 02/15/2021 16:42:35

## 2021-02-15 NOTE — RAD
Indication: Reason: soa and  + d dimer  +cpp, ISIDORO IS AWARE.-mp / Spl. Instructions:  / History: 



Technique: Static images are obtained of both lungs following IV administration of 5.5 mCi of 99 M te
chnetium MAA. 



Comparison: Chest x-ray from February 13, 2021



Findings:

Patchy perfusion defects are seen.  

Can not assess whether this is matched or mismatched given the lack of ventilation images.





Impression: 



1. Overall low probability of pulmonary embolus.



Electronically signed by: Janak Anaya MD (2/15/2021 9:34 AM) [id number]

## 2021-02-15 NOTE — PN
DATE:  02/15/2021



SUBJECTIVE:  A 49-year-old female came in with severe upper back pain

postoperatively as well as being markedly dyspneic, short of breath and

lightheaded.  She also had anterior chest pain.  The patient was seen and

brought in and evaluated for this severe intractable pain as well as these other

factors as noted above.  She seems to be making some progress.  She is receiving

physical and occupational therapy and will continue to be monitored.



OBJECTIVE:

VITAL SIGNS:  The patient's blood pressure 115/60, respiratory rate 20, pulse

70, afebrile.

HEENT:  The patient's head was atraumatic, normocephalic.  Eyes:  PERRLA.

LUNGS:  Diminished.

BACK:  The patient's pain in the upper nap of the back, /

 EXTREMITIES:  No clubbing, cyanosis, nor edema.

NEUROLOGIC:  The patient is alert and oriented x 3.



PLAN:  We will continue to monitor her accordingly and make further evaluation

as indicated.



IMPRESSION:  Intractable thoracic pain, chest pain, dyspnea, vitamin D

deficiency, hypokalemia 2.3, and moderate protein malnutrition.





______________________________

KASEY YOUNG MD



DR:  BETTY/aguila  JOB#:  [id number] / [id number]

DD:  02/15/2021 18:10  DT:  02/15/2021 19:11

## 2021-05-14 NOTE — DISCH
DISCHARGE INSTRUCTIONS


Condition on Discharge


Condition on Discharge:  Stable





Activity After Discharge


Activity Instructions for Disc:  Progressive ambulation





Diet after Discharge


Diet after Discharge:  Regular





Wound Incision Care


Wound/Incision Care:  Ice to area for comfort, Change dressing (Remove dressing 

in 2 days may then shower no soaking until sutures removed)





Contacting the DRGreta after DC


Call your doctor for:  Concerns you may have





Follow-Up


Follow up with:  Dr. Barrera or Brynn 10 days











ARACELI BARRERA MD           May 14, 2021 16:47

## 2021-05-14 NOTE — PDOC4
Operative Note


Operative Note


Date of surgery: 5/14/2021





Preoperative diagnosis: Left knee medial and lateral meniscus tears





Postoperative diagnosis: Same





Operative procedure: Left knee arthroscopy partial medial and lateral 

meniscectomy





Surgeon: Bruce





Assist: Denis dougherty





Anesthesia: General





Estimated blood loss: 5 cc





Complications: None





Operative indications: Please see my preoperative clinic note for detailed 

operative indications and note that we reviewed preoperatively the rationale for

removal of the damaged portions of the meniscus which is unstable causing her 

mechanical symptoms.  I went over that I cannot undo any degenerative type 

symptoms and those would have to be treated symptomatically in an ongoing 

fashion.  We talked about the possibility of infection nerve or blood vessel 

damage continued pain medical or other anesthetic complications among others and

she agrees to proceed with surgical evaluation and treatment.





Operative text: Patient was identified procedure verified patient placed in 

supine position on the operating table.  After adequate amounts of general 

anesthesia were administered the left lower extremity was prepped and draped in 

standard sterile fashion with a thigh tourniquet.  After timeout was performed 

patient procedure identified and verified the left lower extremity was 

exsanguinated by Esmarch bandage tourniquet inflated to 350 mmHg a lateral 

portal was established medial portal established using spinal needle localizati

on and the knee joint was systematically examined.  She was found to have a 

large displaceable parrot-beak tear of the medial meniscus involving the 

junction of the body and posterior horn.  She had additional fraying and 

instability of the posterior horn extending toward the root all of which was 

trimmed back to stable tissue and radius appropriately using the opposite portal

to avoid ongoing stress concentration.  ACL was probed and found to be intact as

was the patellofemoral articulation.  Lateral meniscus had a radial tear in the 

body area and significant free edge fraying all of which was trimmed back to 

stable tissue with the arthroscopic punch and shaver involving approximately the

inner 30% of the lateral meniscus and approximately 60% of the medial meniscus 

tissue at its most severe at the base of the previous parrot-beak tear area.  

Any loose cartilage fragments were collected with the arthroscopic shaver.  

Joint was drained of arthroscopic fluid portals and fat pad area were injected 

with half percent Marcaine with epinephrine portals closed with nylon suture 

sterile dressings were applied toes were noted to be warm pink following 

deflation of tourniquet patient was returned to recovery room in stable 

condition having tolerated procedure well.  Denis dougherty was 

present for the procedure assisted in patient positioning prepping draping 

retraction closure and dressings











ARACELI WISEMAN MD           May 14, 2021 17:34

## 2021-07-01 NOTE — SSS
ADMIT DATE: 2021

CHIEF COMPLAINT:  Right foot ganglion cyst, request for preoperative evaluation.



HISTORY OF PRESENT ILLNESS:  The patient is a pleasant 49-year-old female who 

appears younger than her stated age.  She is a CNA.  Basically, she has got a 

right foot ganglion cyst on the dorsal aspect.  It was partially removed by her 

primary care doctor, but now it has somewhat returned.  She has got some scar 

tissue that is causing pain.  She is going to the OR today with Dr. Cooper 

to have it excised.  We have been requested for preoperative evaluation.



PAST MEDICAL HISTORY:   x 4, tonsillectomy and adenoidectomy, left knee

surgery, lupus, hysterectomy, left rotator cuff, shoulder fracture, C3 and C4 

fusion, uterine cancer, cervical cancer, skin cancer and melanoma and right 

lumpectomy.



ALLERGIES:  PENICILLIN, SULFA, LATEX, BIAXIN, NSAIDS AND LEVAQUIN.



FAMILY HISTORY:  Noncontributory.



SOCIAL HISTORY:  She is a CNA.  She does not drink, smoke or take drugs.  She is

, has 4 children.



MEDICATIONS:  Medications were reviewed.  Please refer to the [facility].



REVIEW OF SYSTEMS:

GENERAL:  No history of weight change, weakness or fevers.

SKIN:  No bruising, hair changes or rashes.

EYES:  No blurred, double or loss of vision.

NOSE AND THROAT:  No history of nosebleeds, hoarseness or sore throat.

HEART:  No history of palpitations, chest pain or shortness of breath on 

exertion.

LUNGS:  Denies cough, hemoptysis, wheezing or shortness of breath.

GASTROINTESTINAL:  Denies changes in appetite, nausea, vomiting, diarrhea or 

constipation.

GENITOURINARY:  No history of frequency, urgency, hesitancy or nocturia.

NEUROLOGIC:  Denies history of numbness, tingling, tremor or weakness.

PSYCHIATRIC:  No history of panic, anxiety or depression.

ENDOCRINE:  No history of heat or cold intolerance, polyuria or polydipsia.

EXTREMITIES:  She complains of right foot pain.



PHYSICAL EXAMINATION:

VITALS:  Within normal limits and are stable.

GENERAL:  No apparent distress.  Alert and oriented.

HEENT:  Normal cephalic atraumatic, external auditory canals are patent.

EYES:  Extraocular muscles are intact, pupils are equally round and reactive to 

light and accommodation.

MUSCULOSKELETAL:  Well developed, well nourished, good range of motion

ENDOCRINE:  No thyromegaly was palpated.

LYMPHATICS:  No cervical chain or axillary nodes were noted.

HEMATOPOIETIC:  No bruising.

NECK:  Supple, no JVD, no thyromegaly was noted.

LUNGS:  Clear to auscultation in all lung fields without rhonchi or wheezing.

HEART:  RRR, S1, S2 present.  Peripheral pulses intact, no obvious murmurs were 

noted.

ABDOMEN:  Soft, nontender.  Positive bowel sounds no organomegaly, normal bowel 

sounds.

EXTREMITIES:  Without any cyanosis, clubbing, or edema.  Pedal pulses intact, 

Homans sign is negative.

NEUROLOGIC:  Normal speech, normal tone.  A and O x 3, moves all extremities, no

obvious focal deficits.

PSYCHIATRIC:  Normal affect, normal mood.  Stable.

SKIN:  She does have a ganglion cyst on her right foot that appears to have been

excised.  There is some residual scar tissue.

VASCULAR:  Good capillary refill, neurovascular bundle appears to be intact.



ASSESSMENT AND PLAN:  Right foot ganglion cyst.  The patient is cleared for 

surgery.  I suspect she is at low risk for intraoperative or postoperative 

complications.  If for some reason, she is to be admitted after surgery, I will 

be available.



Thank you very much for allowing us to participate in the care of this nice 

lady.  After discharge, we will have her see her doctor in a week and continue 

her home medicines and p.r.n. pain meds.







SHIREEN

DR: Loreto   DD: 2021 10:28

DT: 2021 10:44   TID: [id number]

## 2021-07-01 NOTE — OP
DATE OF SURGERY: 07/01/2021

CHIEF COMPLAINT:   Soft tissue mass to the right foot.



HISTORY OF PRESENT ILLNESS:  The patient is a 49-year-old female who complained 

of a palpable soft tissue mass to the dorsal midfoot aspect of her right foot.  

She had had previous attempt of excision in the office with her nurse 

practitioner and no mass was obtained or liquid and she continued to have pain 

to the area in shoegear and weightbearing.  Thus, she says she was seen in the 

office and scheduled for surgical excision.  Discussed with the patient the 

possible risks, benefits and complications to include delayed or nonhealing, 

need for further surgery, recurrence, numbness, tingling, burning, chronic pain,

swelling, delayed or nonhealing, seroma or hematoma, need for further surgery, 

DVT or pulmonary embolism, loss of foot, limb or life.  All questions were 

answered.  The patient understands and agrees with the above said procedure.  No

guarantees made.



DESCRIPTION OF PROCEDURE:  The patient was transported to the operating room via

cart and placed on the operating room table in supine position.  Final 

verification of the surgery, the patient's limb was performed.  She was given IV

sedation per anesthesia and the well-padded tourniquet was placed over the right

ankle and an infiltrative block was given to the right foot consisting of a 1:1 

mixture of 1% lidocaine plain and 0.25% Marcaine plain, 7 mL total.  The right 

foot was then prepped and draped in the usual aseptic manner.  Esmarch bandage 

was used to exsanguinate the right foot and the right ankle tourniquet was 

inflated to 250 mmHg.  Attention was directed to the dorsal midfoot at the site 

of the soft tissue mass and 2 converging semielliptical incisions were made to 

excise the skin and the mass underneath that.  This was noted to be adhered to 

the superficial dorsal cutaneous nerve.  This was safely released.  Small 

vessels were cauterized and there was no fluid filled mass, but there was a firm

nodule, which was excised attached to the skin.  There was no deep probing or 

sinus tracts to the deeper fascia and the wound culture was then taken.  This 

mass was sent to pathology and the wound was copiously irrigated with sterile 

saline and the skin was reapproximated with 3-0 Vicryl and 4-0 nylon.  A postop 

injection was given with 0.5 of dexamethasone.  The wound was dressed with 

Betadine ointment, Adaptic gauze, 4 x 4s, Kerlix and an Ace bandage and the 

tourniquet was deflated after 24 minutes.  Good perfusion was noted to all 

digits of the right foot.  The patient tolerated both anesthesia and procedure 

well and was transported to the PACU with vital signs stable and vascular status

intact to the right foot.  Postop instructions are in the chart.







LELE

DR: Rosa M   DD: 07/01/2021 13:20

DT: 07/01/2021 13:36   TID: [id number]

## 2021-07-01 NOTE — PDOC4
OPERATIVE NOTE:


Surgeon: Allison


Pre op DX: benign soft tissue mass right foot


post op DX: same


Procedure: excision of benign soft tissue mass right foot


Anesthesia: MAC with local


Hemostasis: right ankle tourniquet at 250mmHg x 24 minutes


EBL 1mL


Pathology: soft tissue mass right foot


Patient tolerated anesthesia and procedure well transferred to PACU with VSS and

VSI to right foot











NAKUL WILHELM DPM          Jul 1, 2021 13:26